# Patient Record
Sex: MALE | Race: OTHER | URBAN - METROPOLITAN AREA
[De-identification: names, ages, dates, MRNs, and addresses within clinical notes are randomized per-mention and may not be internally consistent; named-entity substitution may affect disease eponyms.]

---

## 2017-12-20 ENCOUNTER — EMERGENCY (EMERGENCY)
Facility: HOSPITAL | Age: 32
LOS: 1 days | Discharge: HOME | End: 2017-12-20
Attending: EMERGENCY MEDICINE | Admitting: EMERGENCY MEDICINE
Payer: OTHER GOVERNMENT

## 2017-12-20 VITALS
SYSTOLIC BLOOD PRESSURE: 138 MMHG | DIASTOLIC BLOOD PRESSURE: 88 MMHG | TEMPERATURE: 98 F | HEART RATE: 79 BPM | RESPIRATION RATE: 20 BRPM

## 2017-12-20 PROCEDURE — 99284 EMERGENCY DEPT VISIT MOD MDM: CPT

## 2017-12-20 RX ORDER — KETOROLAC TROMETHAMINE 30 MG/ML
30 SYRINGE (ML) INJECTION ONCE
Qty: 0 | Refills: 0 | Status: DISCONTINUED | OUTPATIENT
Start: 2017-12-20 | End: 2017-12-20

## 2017-12-20 RX ORDER — CYCLOBENZAPRINE HYDROCHLORIDE 10 MG/1
1 TABLET, FILM COATED ORAL
Qty: 12 | Refills: 0 | OUTPATIENT
Start: 2017-12-20 | End: 2017-12-23

## 2017-12-20 RX ADMIN — Medication 30 MILLIGRAM(S): at 16:16
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
## 2017-12-20 NOTE — ED PROVIDER NOTE - MUSCULOSKELETAL MINIMAL EXAM
paracervical tenderness, b/l trapezius and rhomboid tenderness, lumbar paraspinal tenderness, no midline tenderness, negative straight leg raise, 5/5 strength, good pulses, sensation intact, reflex 2+ b/l and equal

## 2018-02-12 ENCOUNTER — TRANSCRIPTION ENCOUNTER (OUTPATIENT)
Age: 33
End: 2018-02-12

## 2018-02-15 ENCOUNTER — TRANSCRIPTION ENCOUNTER (OUTPATIENT)
Age: 33
End: 2018-02-15

## 2018-06-12 ENCOUNTER — TRANSCRIPTION ENCOUNTER (OUTPATIENT)
Age: 33
End: 2018-06-12

## 2018-06-15 ENCOUNTER — TRANSCRIPTION ENCOUNTER (OUTPATIENT)
Age: 33
End: 2018-06-15

## 2018-08-14 ENCOUNTER — TRANSCRIPTION ENCOUNTER (OUTPATIENT)
Age: 33
End: 2018-08-14

## 2018-09-21 ENCOUNTER — TRANSCRIPTION ENCOUNTER (OUTPATIENT)
Age: 33
End: 2018-09-21

## 2019-01-09 PROBLEM — Z00.00 ENCOUNTER FOR PREVENTIVE HEALTH EXAMINATION: Status: ACTIVE | Noted: 2019-01-09

## 2019-01-17 ENCOUNTER — APPOINTMENT (OUTPATIENT)
Dept: ORTHOPEDIC SURGERY | Facility: CLINIC | Age: 34
End: 2019-01-17
Payer: OTHER GOVERNMENT

## 2019-01-17 VITALS
HEART RATE: 67 BPM | HEIGHT: 71 IN | BODY MASS INDEX: 26.6 KG/M2 | WEIGHT: 190 LBS | DIASTOLIC BLOOD PRESSURE: 78 MMHG | SYSTOLIC BLOOD PRESSURE: 146 MMHG

## 2019-01-17 DIAGNOSIS — S56.912A STRAIN OF UNSPECIFIED MUSCLES, FASCIA AND TENDONS AT FOREARM LEVEL, LEFT ARM, INITIAL ENCOUNTER: ICD-10-CM

## 2019-01-17 PROCEDURE — 73080 X-RAY EXAM OF ELBOW: CPT | Mod: LT

## 2019-01-17 PROCEDURE — 99203 OFFICE O/P NEW LOW 30 MIN: CPT

## 2019-04-12 ENCOUNTER — TRANSCRIPTION ENCOUNTER (OUTPATIENT)
Age: 34
End: 2019-04-12

## 2019-04-23 ENCOUNTER — TRANSCRIPTION ENCOUNTER (OUTPATIENT)
Age: 34
End: 2019-04-23

## 2019-09-30 ENCOUNTER — TRANSCRIPTION ENCOUNTER (OUTPATIENT)
Age: 34
End: 2019-09-30

## 2021-03-16 ENCOUNTER — TRANSCRIPTION ENCOUNTER (OUTPATIENT)
Age: 36
End: 2021-03-16

## 2022-02-16 ENCOUNTER — APPOINTMENT (OUTPATIENT)
Dept: PULMONOLOGY | Facility: CLINIC | Age: 37
End: 2022-02-16
Payer: COMMERCIAL

## 2022-02-16 VITALS
BODY MASS INDEX: 25.41 KG/M2 | RESPIRATION RATE: 14 BRPM | WEIGHT: 181.5 LBS | DIASTOLIC BLOOD PRESSURE: 75 MMHG | HEART RATE: 62 BPM | HEIGHT: 71 IN | SYSTOLIC BLOOD PRESSURE: 139 MMHG | OXYGEN SATURATION: 98 % | TEMPERATURE: 97.8 F

## 2022-02-16 DIAGNOSIS — U09.9 POST COVID-19 CONDITION, UNSPECIFIED: ICD-10-CM

## 2022-02-16 PROCEDURE — 99204 OFFICE O/P NEW MOD 45 MIN: CPT

## 2022-02-16 RX ORDER — FLUTICASONE PROPIONATE 50 UG/1
50 SPRAY, METERED NASAL TWICE DAILY
Qty: 1 | Refills: 1 | Status: ACTIVE | COMMUNITY
Start: 2022-02-16 | End: 1900-01-01

## 2022-02-16 RX ORDER — ALBUTEROL SULFATE 90 UG/1
108 (90 BASE) INHALANT RESPIRATORY (INHALATION)
Qty: 1 | Refills: 0 | Status: ACTIVE | COMMUNITY
Start: 2022-02-16 | End: 1900-01-01

## 2022-02-16 NOTE — HISTORY OF PRESENT ILLNESS
[Never] : never [TextBox_4] : Mr. STEFFANY LOPEZ is a 36 year old Otherwise healthy who is here for evaluation of post COVID symptoms .\par \par Surgeries otherwise fit, healthy, training in the police academy, was previously in the . He had COVID end of December of 2021. Recovered at home. Since COVID, he noticed slightly decreased exercise tolerance ( he still runs and exercises daily), postnasal drip. He had cough which now resolved\par \par PMH: None\par Meds: None\par All: None\par SH: Never smoker\par FH: Mom passed away from pancreatic cancer. Father had renal cancer and cardiac disease\par PMD: YEMI YVETTE\par Immunizations: COVID vaccinated\par

## 2022-02-16 NOTE — CONSULT LETTER
[Dear  ___] : Dear  [unfilled], [Consult Letter:] : I had the pleasure of evaluating your patient, [unfilled]. [Please see my note below.] : Please see my note below. [Consult Closing:] : Thank you very much for allowing me to participate in the care of this patient.  If you have any questions, please do not hesitate to contact me. [FreeTextEntry3] : Sincerely,\par \par Lottie Dykes MD\par [facility]\par Pulmonary Medicine\par tel: [phone]\par fax: [phone]\par

## 2022-02-16 NOTE — ASSESSMENT
[FreeTextEntry1] : Mr. STEFFANY LOPEZ is a 36 year old Otherwise healthy who is here for evaluation of post COVID symptoms. He is back to his strenuous exercise regiment but notes that his performance is not the same, Reports some chest tightness with vigorous exertion. overall, symptoms  Are consistent with post COVID symptoms. \par -- Can use albuterol prior to exercise\par -- Flonase prescribed for sinus congestion\par -- will obtain CXR (will call with results)\par -- if sx persist, can obtain PFTs\par \par All questions answered. Patient in agreement with plan. \par Follow up prn.\par

## 2024-03-07 ENCOUNTER — EMERGENCY (EMERGENCY)
Facility: HOSPITAL | Age: 39
LOS: 1 days | Discharge: HOME | End: 2024-03-07
Attending: STUDENT IN AN ORGANIZED HEALTH CARE EDUCATION/TRAINING PROGRAM
Payer: COMMERCIAL

## 2024-03-07 VITALS
RESPIRATION RATE: 16 BRPM | TEMPERATURE: 98 F | SYSTOLIC BLOOD PRESSURE: 153 MMHG | DIASTOLIC BLOOD PRESSURE: 93 MMHG | HEART RATE: 72 BPM | OXYGEN SATURATION: 95 %

## 2024-03-07 NOTE — ED PROVIDER NOTE - PATIENT PORTAL LINK FT
You can access the FollowMyHealth Patient Portal offered by [facility] [place] by registering at the following website: [email or web address]. By joining [employer]’s FollowMyHealth portal, you will also be able to view your health information using other applications (apps) compatible with our system.

## 2024-03-07 NOTE — ED PROVIDER NOTE - WHICH SHOWED
NSR at 66 bpm, no acute ischemic changes NSR at 66 bpm, no acute ischemic changes  CXR - clear lungs, no pneumothorax  XR ribs - no obvious fracture
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
## 2024-03-07 NOTE — ED PROVIDER NOTE - PHYSICAL EXAMINATION
Gen: No acute distress, non toxic  HEENT: NCAT. Mucous membranes moist, pink conjunctivae, EOMI. PERRL   CV: RRR, nl s1/s2.   Resp: CTAB, normal rate and effort. equal expansion b/l . no wheezes, rhonchi or crackles  GI: Abdomen soft, NT, ND. No rebound, no guarding  Neuro: A&O x4, MAEx4. 5/5 str ext x 4. Sensation intact, symmetric throughout. No fnd's  MSK: No midline spinal ttp. +ttp over the left lower ribs on the lateral chest. no visualized or palpable deformities. no tenting of the skin   Skin: No rashes. intact and perfused. Gen: No acute distress, non toxic  HEENT: NCAT. Mucous membranes moist, pink conjunctivae, EOMI. PERRL   CV: RRR, nl s1/s2. no ecchymosis  Resp: CTAB, normal rate and effort. equal expansion b/l . no wheezes, rhonchi or crackles  GI: Abdomen soft, NT, ND. No rebound, no guarding  Neuro: A&O x4, MAEx4. 5/5 str ext x 4. Sensation intact, symmetric throughout. No fnd's  MSK: No midline spinal ttp. +ttp over the left lower ribs on the lateral chest. no visualized or palpable deformities. no tenting of the skin   Skin: No rashes. intact and perfused.
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
## 2024-08-26 ENCOUNTER — APPOINTMENT (OUTPATIENT)
Dept: ORTHOPEDIC SURGERY | Facility: CLINIC | Age: 39
End: 2024-08-26
Payer: OTHER MISCELLANEOUS

## 2024-08-26 VITALS — WEIGHT: 190 LBS | HEIGHT: 71 IN | BODY MASS INDEX: 26.6 KG/M2

## 2024-08-26 PROCEDURE — 73030 X-RAY EXAM OF SHOULDER: CPT | Mod: LT

## 2024-08-26 PROCEDURE — 99204 OFFICE O/P NEW MOD 45 MIN: CPT

## 2024-08-26 PROCEDURE — 73010 X-RAY EXAM OF SHOULDER BLADE: CPT | Mod: LT

## 2024-08-26 RX ORDER — METHYLPREDNISOLONE 4 MG/1
4 TABLET ORAL
Qty: 1 | Refills: 0 | Status: ACTIVE | COMMUNITY
Start: 2024-08-26 | End: 1900-01-01

## 2024-08-26 NOTE — PHYSICAL EXAM
[Left] : left shoulder [Sitting] : sitting [Moderate] : moderate [Mild] : mild [4 ___] : forward flexion 4[unfilled]/5 [] : no sensory deficits [FreeTextEntry9] : R SHOULDER: 165/75/T12 [de-identified] : +arc [TWNoteComboBox4] : passive forward flexion 150 degrees [TWNoteComboBox6] : internal rotation lateral hip [de-identified] : external rotation 50 degrees
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
## 2024-08-26 NOTE — IMAGING
[Left] : left shoulder [FreeTextEntry1] : The GH and AC joints are OK. [FreeTextEntry5] : There is a Type I-II acromion.

## 2024-08-26 NOTE — ASSESSMENT
[FreeTextEntry1] : We reviewed the findings and the history. Questions were answered and concerns addressed. The options were outlined. MDP planned. PT is prescribed. We will see him next week to re evaluate his status. He is OOW until then. If symptoms persist an MRI is planned.   Patient was seen by Dr. Leif Rios. Patient was seen by Jacqueline SANTOS under the supervision of Dr. Leif Rios. Progress note was completed by Jacqueline SANTOS. Entered by Keila Santos acting as scribe.

## 2024-08-26 NOTE — WORK
[Sprain/Strain] : sprain/strain [Was the competent medical cause of the injury] : was the competent medical cause of the injury [Are consistent with the injury] : are consistent with the injury [Consistent with my objective findings] : consistent with my objective findings [Total (100%)] : total (100%) [FreeTextEntry1] : He is OOW TD.
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
## 2024-08-27 ENCOUNTER — NON-APPOINTMENT (OUTPATIENT)
Age: 39
End: 2024-08-27

## 2024-09-06 ENCOUNTER — APPOINTMENT (OUTPATIENT)
Dept: ORTHOPEDIC SURGERY | Facility: CLINIC | Age: 39
End: 2024-09-06
Payer: OTHER MISCELLANEOUS

## 2024-09-06 DIAGNOSIS — S46.912A STRAIN OF UNSPECIFIED MUSCLE, FASCIA AND TENDON AT SHOULDER AND UPPER ARM LEVEL, LEFT ARM, INITIAL ENCOUNTER: ICD-10-CM

## 2024-09-06 DIAGNOSIS — M75.42 IMPINGEMENT SYNDROME OF LEFT SHOULDER: ICD-10-CM

## 2024-09-06 PROCEDURE — 99214 OFFICE O/P EST MOD 30 MIN: CPT

## 2024-09-06 RX ORDER — CELECOXIB 200 MG/1
200 CAPSULE ORAL TWICE DAILY
Qty: 60 | Refills: 0 | Status: ACTIVE | COMMUNITY
Start: 2024-09-06 | End: 2024-10-06
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
## 2024-09-06 NOTE — ASSESSMENT
[FreeTextEntry1] : We discussed his options.  He still has notable pain with guarding. He has pain with strength testing.  Celebrex 200 mg is described.  PT is again prescribed.  Cautions advised.   Patient seen by Leif Rios M.D. Entered by Louise Perez acting as scribe.

## 2024-09-06 NOTE — PHYSICAL EXAM
[Left] : left shoulder [Sitting] : sitting [Moderate] : moderate [Mild] : mild [4 ___] : forward flexion 4[unfilled]/5 [] : no sensory deficits [FreeTextEntry9] : R SHOULDER: 165/75/T12 [de-identified] : +arc [TWNoteComboBox4] : passive forward flexion 150 degrees [TWNoteComboBox6] : internal rotation L4 [de-identified] : external rotation 60 degrees

## 2024-09-27 ENCOUNTER — APPOINTMENT (OUTPATIENT)
Dept: ORTHOPEDIC SURGERY | Facility: CLINIC | Age: 39
End: 2024-09-27
Payer: OTHER MISCELLANEOUS

## 2024-09-27 VITALS — WEIGHT: 190 LBS | HEIGHT: 71 IN | BODY MASS INDEX: 26.6 KG/M2

## 2024-09-27 DIAGNOSIS — M75.42 IMPINGEMENT SYNDROME OF LEFT SHOULDER: ICD-10-CM

## 2024-09-27 DIAGNOSIS — S46.912A STRAIN OF UNSPECIFIED MUSCLE, FASCIA AND TENDON AT SHOULDER AND UPPER ARM LEVEL, LEFT ARM, INITIAL ENCOUNTER: ICD-10-CM

## 2024-09-27 DIAGNOSIS — Z78.9 OTHER SPECIFIED HEALTH STATUS: ICD-10-CM

## 2024-09-27 PROCEDURE — 99214 OFFICE O/P EST MOD 30 MIN: CPT

## 2024-09-27 RX ORDER — CELECOXIB 200 MG/1
200 CAPSULE ORAL DAILY
Qty: 30 | Refills: 0 | Status: ACTIVE | COMMUNITY
Start: 2024-09-27 | End: 1900-01-01

## 2024-09-27 NOTE — ASSESSMENT
[FreeTextEntry1] : We discussed his options. With continued pain, an MRI is planned. We renewed celebrex. Tx pending results. An injection is an option. He will continue PT. No work is planned, yet.  Patient seen by Dr. Lief Rios, who determined the assessment and plan. Jacqueline SANTOS was present for and participated in aspects of the office encounter.

## 2024-09-27 NOTE — PHYSICAL EXAM
[Left] : left shoulder [Sitting] : sitting [Moderate] : moderate [Mild] : mild [4 ___] : forward flexion 4[unfilled]/5 [] : no sensory deficits [FreeTextEntry9] : R SHOULDER: 165/75/T12 [de-identified] : +arc [TWNoteComboBox4] : passive forward flexion 150 degrees [TWNoteComboBox6] : internal rotation L4 [de-identified] : external rotation 60 degrees

## 2024-09-27 NOTE — HISTORY OF PRESENT ILLNESS
[Work related] : work related [5] : 5 [4] : 4 [Dull/Aching] : dull/aching [Not working due to injury] : Work status: not working due to injury [] : yes [de-identified] : Follow Up L. Shoulder. Patient states that he is feeling slightly better. He still has some pain and discomfort. Patient is going to PT 3 times a week. Patient states he is lifting 10-pound weights at PT now. [FreeTextEntry1] : L shoulder [FreeTextEntry3] : 8/22/24 [de-identified] :
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
## 2024-10-03 ENCOUNTER — RESULT REVIEW (OUTPATIENT)
Age: 39
End: 2024-10-03

## 2024-10-09 ENCOUNTER — APPOINTMENT (OUTPATIENT)
Dept: ORTHOPEDIC SURGERY | Facility: CLINIC | Age: 39
End: 2024-10-09
Payer: OTHER MISCELLANEOUS

## 2024-10-09 VITALS — WEIGHT: 190 LBS | HEIGHT: 71 IN | BODY MASS INDEX: 26.6 KG/M2

## 2024-10-09 DIAGNOSIS — S43.432D SUPERIOR GLENOID LABRUM LESION OF LEFT SHOULDER, SUBSEQUENT ENCOUNTER: ICD-10-CM

## 2024-10-09 DIAGNOSIS — M75.42 IMPINGEMENT SYNDROME OF LEFT SHOULDER: ICD-10-CM

## 2024-10-09 PROCEDURE — 99214 OFFICE O/P EST MOD 30 MIN: CPT | Mod: 25

## 2024-10-09 PROCEDURE — 20611 DRAIN/INJ JOINT/BURSA W/US: CPT | Mod: LT

## 2024-10-30 ENCOUNTER — APPOINTMENT (OUTPATIENT)
Dept: ORTHOPEDIC SURGERY | Facility: CLINIC | Age: 39
End: 2024-10-30